# Patient Record
Sex: MALE | HISPANIC OR LATINO | URBAN - METROPOLITAN AREA
[De-identification: names, ages, dates, MRNs, and addresses within clinical notes are randomized per-mention and may not be internally consistent; named-entity substitution may affect disease eponyms.]

---
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
## 2025-01-24 ENCOUNTER — TELEPHONE (OUTPATIENT)
Dept: FAMILY MEDICINE CLINIC | Facility: CLINIC | Age: 20
End: 2025-01-24

## 2025-05-15 ENCOUNTER — OFFICE VISIT (OUTPATIENT)
Dept: URGENT CARE | Facility: CLINIC | Age: 20
End: 2025-05-15
Payer: COMMERCIAL

## 2025-05-15 VITALS
HEART RATE: 108 BPM | BODY MASS INDEX: 28.84 KG/M2 | SYSTOLIC BLOOD PRESSURE: 112 MMHG | OXYGEN SATURATION: 96 % | RESPIRATION RATE: 18 BRPM | WEIGHT: 206 LBS | TEMPERATURE: 100.6 F | DIASTOLIC BLOOD PRESSURE: 80 MMHG | HEIGHT: 71 IN

## 2025-05-15 DIAGNOSIS — B34.9 ACUTE VIRAL SYNDROME: ICD-10-CM

## 2025-05-15 DIAGNOSIS — H00.011 HORDEOLUM EXTERNUM OF RIGHT UPPER EYELID: Primary | ICD-10-CM

## 2025-05-15 DIAGNOSIS — R50.9 FEVER, UNSPECIFIED FEVER CAUSE: ICD-10-CM

## 2025-05-15 LAB
SARS-COV-2 AG UPPER RESP QL IA: NEGATIVE
VALID CONTROL: NORMAL

## 2025-05-15 PROCEDURE — 87811 SARS-COV-2 COVID19 W/OPTIC: CPT

## 2025-05-15 PROCEDURE — 99213 OFFICE O/P EST LOW 20 MIN: CPT

## 2025-05-15 RX ORDER — METHYLPREDNISOLONE 4 MG/1
TABLET ORAL
Qty: 21 TABLET | Refills: 0 | Status: SHIPPED | OUTPATIENT
Start: 2025-05-15

## 2025-05-15 RX ORDER — ERYTHROMYCIN 5 MG/G
0.5 OINTMENT OPHTHALMIC
Qty: 3.5 G | Refills: 0 | Status: SHIPPED | OUTPATIENT
Start: 2025-05-15 | End: 2025-05-22

## 2025-05-15 NOTE — LETTER
May 15, 2025     Patient: Neto Spencer   YOB: 2005   Date of Visit: 5/15/2025       To Whom It May Concern:    It is my medical opinion that Neto Spencer may return to work on 05/16/2025 as long as fever free.    If you have any questions or concerns, please don't hesitate to call.         Sincerely,        ELIUD Lucio    CC: No Recipients

## 2025-05-15 NOTE — PATIENT INSTRUCTIONS
Saline nasal spray as needed to each nostril  Flonase nasal spray 1 spray to each nostril daily    Start taking Allegra or Zyrtec on a regular basis Zyrtec should be taken at nighttime because it may cause drowsiness.    Start the steroids tomorrow    Tylenol or Motrin for fever or discomfort.  [place] diet and increase your fluids    Warm compresses to the right eye at least 4 times a day  Wash the eyelid with baby shampoo and water  Use the eye ointment as directed  Follow-up with the ophthalmologist    If your symptoms get worse go to the ER

## 2025-05-15 NOTE — PROGRESS NOTES
[facility]        NAME: Neto Spencer is a 19 y.o. male  : 2005    MRN: [id number]  DATE: May 15, 2025  TIME: 12:31 PM    Assessment and Plan   Hordeolum externum of right upper eyelid [H00.011]  1. Hordeolum externum of right upper eyelid  erythromycin (ILOTYCIN) ophthalmic ointment    Ambulatory Referral to Ophthalmology      2. Fever, unspecified fever cause  Poct Covid 19 Rapid Antigen Test    erythromycin (ILOTYCIN) ophthalmic ointment    methylPREDNISolone 4 MG tablet therapy pack      3. Acute viral syndrome  methylPREDNISolone 4 MG tablet therapy pack            Patient Instructions   Saline nasal spray as needed to each nostril  Flonase nasal spray 1 spray to each nostril daily    Start taking Allegra or Zyrtec on a regular basis Zyrtec should be taken at nighttime because it may cause drowsiness.    Start the steroids tomorrow    Tylenol or Motrin for fever or discomfort.  [place] diet and increase your fluids    Warm compresses to the right eye at least 4 times a day  Wash the eyelid with baby shampoo and water  Use the eye ointment as directed  Follow-up with the ophthalmologist    If your symptoms get worse go to the ER    Follow up with PCP in 3-5 days.  Proceed to  ER if symptoms worsen.    If tests have been performed at [facility], our office will contact you with results if changes need to be made to the care plan discussed with you at the visit.  [facility]    Chief Complaint     Chief Complaint   Patient presents with    Cold Like Symptoms     Symptoms started yesterday. Body aches, nasal congestion, vomiting (last time yesterday), diarrhea, headache, sinus pressure, presumed fever. Admits that his coworkers have been sick. Has been taking mucinex OTC without relief.          History of Present Illness       This is a 19-year-old male who presents today with bodyaches, nasal congestion, vomiting once yesterday sinus pain and  "pressure.  No fever but has had chills, he states he has had a low-grade temp and has not taken his temperature at home.  Patient has been taking NyQuil DayQuil and Mucinex.  He also states that he was seen 2/16/2025 for infection of the upper eyelid on the right.  He states the swelling is gone down but he still has a little redness..  Rapid COVID test in the office was negative.        Review of Systems   Review of Systems   Constitutional:  Positive for fatigue and fever.   HENT:  Positive for congestion, postnasal drip, sinus pressure and sinus pain. Negative for sore throat.    Respiratory:  Negative for cough and shortness of breath.    Cardiovascular: Negative.    Gastrointestinal:  Positive for nausea and vomiting.   Genitourinary: Negative.    Musculoskeletal:  Positive for myalgias.   Neurological: Negative.          Current Medications     Current Medications[1]    Current Allergies     Allergies as of 05/15/2025 - Reviewed 05/15/2025   Allergen Reaction Noted    Nuts - food allergy Other (See Comments) 01/06/2018    Other Other (See Comments) 01/03/2018    Sesame oil - food allergy Other (See Comments) 01/06/2018    Shellfish allergy - food allergy Other (See Comments) 01/06/2018            The following portions of the patient's history were reviewed and updated as appropriate: allergies, current medications, past family history, past medical history, past social history, past surgical history and problem list.     History reviewed. No pertinent past medical history.    History reviewed. No pertinent surgical history.    History reviewed. No pertinent family history.      Medications have been verified.        Objective   /80   Pulse (!) 108   Temp (!) 100.6 °F (38.1 °C) (Tympanic)   Resp 18   Ht 5' 11\" (1.803 m)   Wt 93.4 kg (206 lb)   SpO2 96%   BMI 28.73 kg/m²   No LMP for male patient.       Physical Exam     Physical Exam  Constitutional:       Appearance: Normal appearance.   HENT:    "   Head: Normocephalic and atraumatic.      Right Ear: External ear normal. There is impacted cerumen.      Left Ear: Tympanic membrane, ear canal and external ear normal.      Nose: Congestion present.      Mouth/Throat:      Mouth: Mucous membranes are moist.      Pharynx: No posterior oropharyngeal erythema.     Eyes:      Conjunctiva/sclera: Conjunctivae normal.      Pupils: Pupils are equal, round, and reactive to light.      Comments: Patient has a slightly reddened area on the upper eyelid externally.  He denies any discharge or visual changes.     Cardiovascular:      Rate and Rhythm: Normal rate and regular rhythm.      Pulses: Normal pulses.      Heart sounds: Normal heart sounds.   Pulmonary:      Effort: Pulmonary effort is normal.      Breath sounds: Normal breath sounds.   Abdominal:      General: Abdomen is flat.      Palpations: Abdomen is soft.      Tenderness: There is no abdominal tenderness. There is no right CVA tenderness, left CVA tenderness or guarding.     Musculoskeletal:         General: Normal range of motion.   Lymphadenopathy:      Cervical: No cervical adenopathy.     Skin:     General: Skin is warm.      Capillary Refill: Capillary refill takes less than 2 seconds.     Neurological:      General: No focal deficit present.      Mental Status: He is alert and oriented to person, place, and time.     Psychiatric:         Mood and Affect: Mood normal.         Thought Content: Thought content normal.                        [1]   Current Outpatient Medications:     erythromycin (ILOTYCIN) ophthalmic ointment, Administer 0.5 inches to the right eye daily at bedtime for 7 days, Disp: 3.5 g, Rfl: 0    methylPREDNISolone 4 MG tablet therapy pack, Use as directed on package, Disp: 21 tablet, Rfl: 0    chlorhexidine (PERIDEX) 0.12 % solution, RINSE AND SPIT MOUTH WITH HALF OZ FOR 30 TO 60 SECONDS 2 TIMES A DAY BEGIN 1 DAY AFTER SURGERY (Patient not taking: Reported on 5/6/2024), Disp: , Rfl:      EPINEPHrine (EPIPEN) 0.3 mg/0.3 mL SOAJ, Inject 0.3 mL (0.3 mg total) into a muscle once for 1 dose, Disp: 0.6 mL, Rfl: 0    Multiple Vitamins-Iron (MULTIVITAMIN/IRON PO), Take by mouth (Patient not taking: Reported on 5/6/2024), Disp: , Rfl: